# Patient Record
Sex: FEMALE | Race: BLACK OR AFRICAN AMERICAN | NOT HISPANIC OR LATINO | Employment: UNEMPLOYED | URBAN - METROPOLITAN AREA
[De-identification: names, ages, dates, MRNs, and addresses within clinical notes are randomized per-mention and may not be internally consistent; named-entity substitution may affect disease eponyms.]

---

## 2017-05-06 ENCOUNTER — HOSPITAL ENCOUNTER (EMERGENCY)
Facility: HOSPITAL | Age: 32
Discharge: HOME | End: 2017-05-06
Attending: EMERGENCY MEDICINE
Payer: MEDICAID

## 2017-05-06 VITALS
BODY MASS INDEX: 26.68 KG/M2 | SYSTOLIC BLOOD PRESSURE: 130 MMHG | DIASTOLIC BLOOD PRESSURE: 73 MMHG | OXYGEN SATURATION: 100 % | WEIGHT: 170 LBS | RESPIRATION RATE: 20 BRPM | HEIGHT: 67 IN | TEMPERATURE: 98 F | HEART RATE: 98 BPM

## 2017-05-06 DIAGNOSIS — K12.1 MOUTH ULCERS: Primary | ICD-10-CM

## 2017-05-06 PROCEDURE — 99283 EMERGENCY DEPT VISIT LOW MDM: CPT

## 2017-05-06 NOTE — ED AVS SNAPSHOT
[facility] -   [address] [facility] [address]               Benedicto Streeter   2017  9:19 PM   ED    Description:  Female : 1985   Department:  [facility] -            Your Care was Coordinated By:     Provider Role From To    Tano Smith NP Nurse Practitioner [id number] --      Reason for Visit     Mouth Lesions           Diagnoses this Visit        Comments    Mouth ulcers    -  Primary       ED Disposition     None           To Do List           Follow-up Information     Schedule an appointment as soon as possible for a visit with pcp.       These Medications        Disp Refills Start End    (Magic mouthwash) 1:1:1 Benadryl 12.5mg/5ml liq, aluminum & magnesium hydroxide-simehticone (Maalox), lidocaine viscous 2% 100 mL 0 2017     Swish and spit 5 mLs every 4 (four) hours as needed. for mouth sores - Swish & Spit      [facility]     [facility] -  Assistance  Unless otherwise directed by your provider, please contact [facility], our nurse care line that is available for  assistance.     Registered nurses in the [facility] provide: appointment scheduling, clinical advisement, health education, and other advisory services.  Call: [phone] (toll free)               Medications           Message regarding Medications     Verify the changes and/or additions to your medication regime listed below are the same as discussed with your clinician today.  If any of these changes or additions are incorrect, please notify your healthcare provider.        START taking these NEW medications        Refills    (Magic mouthwash) 1:1:1 Benadryl 12.5mg/5ml liq, aluminum & magnesium hydroxide-simehticone (Maalox), lidocaine viscous 2% 0    Sig: Swish and spit 5 mLs every 4 (four) hours as needed. for mouth sores    Class: Print    Route: Swish & Spit           Verify that the below list of  "medications is an accurate representation of the medications you are currently taking.  If none reported, the list may be blank. If incorrect, please contact your healthcare provider. Carry this list with you in case of emergency.           Current Medications     (Magic mouthwash) 1:1:1 Benadryl 12.5mg/5ml liq, aluminum & magnesium hydroxide-simehticone (Maalox), lidocaine viscous 2% Swish and spit 5 mLs every 4 (four) hours as needed. for mouth sores    PRENATAL VIT W-CA,FE,FA,<1 MG, (PRENATAL VITAMIN ORAL) Take 1 tablet by mouth once daily.            Clinical Reference Information           Your Vitals Were     BP Pulse Temp Resp Height Weight    130/73 (BP Location: Right arm, Patient Position: Sitting) 98 98.2 °F (36.8 °C) (Oral) 20 5' 7" (1.702 m) 77.1 kg (170 lb)    SpO2 BMI             100% 26.63 kg/m2         Allergies as of 5/6/2017     No Known Allergies      Immunizations Administered on Date of Encounter - 5/6/2017     None      ED Micro, Lab, POCT     None      ED Imaging Orders     None      Discharge References/Attachments     APHTHOUS ULCER (ENGLISH)    APHTHOUS ULCER, CANKER SORE (CHILD) (ENGLISH)      [facility] Sign-Up     Activating your [facility] account is as easy as 1-2-3!     1) Visit [email or web address], select Sign Up Now, enter this activation code and your date of birth, then select Next.  [email or web address]  Expires: 6/20/2017  9:44 PM      2) Create a username and password to use when you visit [facility] in the future and select a security question in case you lose your password and select Next.    3) Enter your e-mail address and click Sign Up!    Additional Information  If you have questions, please e-mail [email or web address] or call [phone] to talk to our [facility] staff. Remember, [facility] is NOT to be used for urgent needs. For medical emergencies, dial 911.          [facility] complies with applicable Federal civil rights laws and does not discriminate on the " basis of race, color, national origin, age, disability, or sex.        Language Assistance Services     ATTENTION: Language assistance services are available, free of charge. Please call [phone].      ATENCIÓN: Si habla marci, tiene a wagoner disposición servicios gratuitos de asistencia lingüística. Llame al [phone].     CHÚ Ý: N?u b?n nói Ti?ng Vi?t, có các d?ch v? h? tr? ngôn ng? mi?n phí dành cho b?n. G?i s? [phone].

## 2018-04-13 ENCOUNTER — HOSPITAL ENCOUNTER (EMERGENCY)
Facility: HOSPITAL | Age: 33
Discharge: HOME | End: 2018-04-13
Payer: MEDICAID

## 2018-04-13 VITALS
OXYGEN SATURATION: 96 % | DIASTOLIC BLOOD PRESSURE: 70 MMHG | WEIGHT: 210 LBS | BODY MASS INDEX: 32.96 KG/M2 | HEIGHT: 67 IN | HEART RATE: 80 BPM | RESPIRATION RATE: 16 BRPM | SYSTOLIC BLOOD PRESSURE: 131 MMHG | TEMPERATURE: 99 F

## 2018-04-13 DIAGNOSIS — K02.9 DENTAL CARIES: ICD-10-CM

## 2018-04-13 DIAGNOSIS — H92.02 LEFT EAR PAIN: Primary | ICD-10-CM

## 2018-04-13 PROCEDURE — 99283 EMERGENCY DEPT VISIT LOW MDM: CPT

## 2018-04-13 RX ORDER — NAPROXEN 375 MG/1
375 TABLET ORAL 2 TIMES DAILY WITH MEALS
Qty: 20 TABLET | Refills: 0 | OUTPATIENT
Start: 2018-04-13 | End: 2019-12-28

## 2018-04-13 RX ORDER — TRAMADOL HYDROCHLORIDE 50 MG/1
50 TABLET ORAL EVERY 6 HOURS PRN
Qty: 12 TABLET | Refills: 0 | Status: SHIPPED | OUTPATIENT
Start: 2018-04-13 | End: 2018-04-23

## 2018-04-14 NOTE — ED PROVIDER NOTES
SCRIBE #1 NOTE: I, Radha Mckeon, am scribing for, and in the presence of, Hoang Krueger NP. I have scribed the entire note.      History      Chief Complaint   Patient presents with    Otalgia     left ear ache x 5 days, denies any fever        Review of patient's allergies indicates:  No Known Allergies     HPI   HPI    2018, 8:56 PM   History obtained from the patient      History of Present Illness: Benedicto Streeter is a 32 y.o. female patient who presents to the Emergency Department for a L ear ache which onset gradually over the past x5 days. Symptoms are constant and moderate in severity.  No mitigating or exacerbating factors reported. Associated sxs include intermittent coughing and pink eye. Patient denies any fever, chills, sore throat, rhinorrhea, n/v/d, abd pain, dysuria, hematuria, light-headedness, dizziness, and all other sxs at this time.No further complaints or concerns at this time.         Arrival mode: Personal vehicle    PCP: Primary Doctor No       Past Medical History:  No past medical history on file.    Past Surgical History:  Past Surgical History:   Procedure Laterality Date    BLADDER REPAIR W/  SECTION      LASER ABLATION      placental vessels d/t twin twin transfusion         Family History:  Family History   Problem Relation Age of Onset    No Known Problems Mother     No Known Problems Father     Cancer Neg Hx     Arrhythmia Neg Hx     Cardiomyopathy Neg Hx     Congenital heart disease Neg Hx     Early death Neg Hx     Heart attacks under age 50 Neg Hx     Hypertension Neg Hx     Long QT syndrome Neg Hx     Pacemaker/defibrilator Neg Hx     Premature birth Neg Hx        Social History:  Social History     Social History Main Topics    Smoking status: Never Smoker    Smokeless tobacco: Not on file    Alcohol use No    Drug use: No    Sexual activity: Yes     Partners: Male     ROS   Review of Systems   Constitutional: Negative for chills and  fever.   HENT: Positive for ear pain (L ear). Negative for rhinorrhea and sore throat.    Eyes:        +pink eye   Respiratory: Positive for cough (intermittent). Negative for shortness of breath.    Cardiovascular: Negative for chest pain.   Gastrointestinal: Negative for abdominal pain, diarrhea, nausea and vomiting.   Genitourinary: Negative for dysuria and hematuria.   Musculoskeletal: Negative for back pain.   Skin: Negative for rash.   Neurological: Negative for dizziness, weakness, light-headedness and headaches.   Hematological: Does not bruise/bleed easily.   All other systems reviewed and are negative.    Physical Exam      Initial Vitals [04/13/18 2009]   BP Pulse Resp Temp SpO2   131/70 80 16 99.1 °F (37.3 °C) 96 %      MAP       90.33          Physical Exam  Nursing Notes and Vital Signs Reviewed.  Constitutional: Patient is in no apparent distress. Well-developed and well-nourished.  Head: Atraumatic. Normocephalic.  Eyes: PERRL. EOM intact. Conjunctivae are not pale. No scleral icterus.  Ears: Right TM normal. Left TM normal. No erythema. No bulging. No effusion or air-fluid levels. No perforation.   Nose: Patent nares. Turbinates are normal. No drainage.   Mouth/Throat: No evident facial swelling. Patient handles secretions normally. Positive for dental caries. Negative for gingival edema. No palpable fluctuance. No evidence of periodontal or periapical abscess. No trismus.   Neck: Supple. Full ROM. No lymphadenopathy.  Cardiovascular: Regular rate. Regular rhythm. No murmurs, rubs, or gallops. Distal pulses are 2+ and symmetric.  Pulmonary/Chest: No respiratory distress. Clear to auscultation bilaterally. No wheezing or rales.  Abdominal: Soft and non-distended.  There is no tenderness.  No rebound, guarding, or rigidity. Good bowel sounds.  Genitourinary: No CVA tenderness  Musculoskeletal: Moves all extremities. No obvious deformities. No edema. No calf tenderness.  Skin: Warm and  "dry.  Neurological:  Alert, awake, and appropriate.  Normal speech.  No acute focal neurological deficits are appreciated.  Psychiatric: Normal affect. Good eye contact. Appropriate in content.    ED Course    Procedures  ED Vital Signs:  Vitals:    04/13/18 2009   BP: 131/70   Pulse: 80   Resp: 16   Temp: 99.1 °F (37.3 °C)   TempSrc: Oral   SpO2: 96%   Weight: 95.2 kg (209 lb 15.8 oz)   Height: 5' 7" (1.702 m)            The Emergency Provider reviewed the vital signs and test results, which are outlined above.    ED Discussion   9:05 PM: Discussed with pt all pertinent ED information. Discussed pt dx and plan of tx. Gave pt all f/u and return to the ED instructions. All questions and concerns were addressed at this time. Pt expresses understanding of information and instructions, and is comfortable with plan to discharge. Pt is stable for discharge.    I discussed with patient and/or family/caretaker that evaluation in the ED does not suggest any emergent or life threatening medical conditions requiring immediate intervention beyond what was provided in the ED, and I believe patient is safe for discharge.  Regardless, an unremarkable evaluation in the ED does not preclude the development or presence of a serious of life threatening condition. As such, patient was instructed to return immediately for any worsening or change in current symptoms.    ED Medication(s):  Medications - No data to display    New Prescriptions    NAPROXEN (NAPROSYN) 375 MG TABLET    Take 1 tablet (375 mg total) by mouth 2 (two) times daily with meals.    TRAMADOL (ULTRAM) 50 MG TABLET    Take 1 tablet (50 mg total) by mouth every 6 (six) hours as needed.       Follow-up Information     Primary Doctor No In 3 days.                   Medical Decision Making              Scribe Attestation:   Scribe #1: I performed the above scribed service and the documentation accurately describes the services I performed. I attest to the accuracy of the " note.    Attending:   Physician Attestation Statement for Scribe #1: I, Hoang Krueger NP, personally performed the services described in this documentation, as scribed by Radha Mckeon, in my presence, and it is both accurate and complete.          Clinical Impression       ICD-10-CM ICD-9-CM   1. Left ear pain H92.02 388.70   2. Dental caries K02.9 521.00       Disposition:   Disposition: Discharged  Condition: Stable         Hoang Krueger Jr., [facility]  04/14/18 1115

## 2019-12-28 ENCOUNTER — HOSPITAL ENCOUNTER (EMERGENCY)
Facility: HOSPITAL | Age: 34
Discharge: HOME | End: 2019-12-28
Attending: EMERGENCY MEDICINE
Payer: MEDICAID

## 2019-12-28 VITALS
OXYGEN SATURATION: 98 % | BODY MASS INDEX: 36.29 KG/M2 | RESPIRATION RATE: 18 BRPM | TEMPERATURE: 99 F | WEIGHT: 239.44 LBS | SYSTOLIC BLOOD PRESSURE: 115 MMHG | DIASTOLIC BLOOD PRESSURE: 69 MMHG | HEART RATE: 74 BPM | HEIGHT: 68 IN

## 2019-12-28 DIAGNOSIS — S80.11XA CONTUSION OF RIGHT LOWER LEG, INITIAL ENCOUNTER: ICD-10-CM

## 2019-12-28 DIAGNOSIS — T19.2XXA FOREIGN BODY IN VAGINA, INITIAL ENCOUNTER: Primary | ICD-10-CM

## 2019-12-28 DIAGNOSIS — R03.0 ELEVATED BLOOD PRESSURE READING: ICD-10-CM

## 2019-12-28 DIAGNOSIS — M79.661 PAIN IN RIGHT LOWER LEG: ICD-10-CM

## 2019-12-28 PROCEDURE — 99283 EMERGENCY DEPT VISIT LOW MDM: CPT | Mod: 25

## 2019-12-28 NOTE — ED PROVIDER NOTES
History      Chief Complaint   Patient presents with    Knee Pain     R knee, reports hit knee on corner of bed 3 weeks ago and pain is still there, bruising to area, pt wearing knee brace in triage     Foreign Body in Vagina     reports having intercourse last night and condom fell off inside vagina and hasnt came out       Review of patient's allergies indicates:  No Known Allergies     HPI   HPI    2019, 1:03 PM   History obtained from the patient      History of Present Illness: Benedicto Streeter is a 34 y.o. female patient who presents to the Emergency Department for right lower leg pain x 3 weeks.  Patient states that she hit shin on bed frame three weeks ago and continues to have pain.   Patient also reports having intercourse last night and condom fell off inside of vagina.  Denies head injury, LOC, dysuria, vaginal discharge, fever, vomiting, diarrhea, chest pain, SOB, headache, dizziness.       Arrival mode: Personal vehicle    PCP: Primary Doctor No       Past Medical History:  History reviewed. No pertinent past medical history.    Past Surgical History:  Past Surgical History:   Procedure Laterality Date    BLADDER REPAIR W/  SECTION      LASER ABLATION      placental vessels d/t twin twin transfusion         Family History:  Family History   Problem Relation Age of Onset    No Known Problems Mother     No Known Problems Father     Cancer Neg Hx     Arrhythmia Neg Hx     Cardiomyopathy Neg Hx     Congenital heart disease Neg Hx     Early death Neg Hx     Heart attacks under age 50 Neg Hx     Hypertension Neg Hx     Long QT syndrome Neg Hx     Pacemaker/defibrilator Neg Hx     Premature birth Neg Hx        Social History:  Social History     Tobacco Use    Smoking status: Never Smoker    Smokeless tobacco: Never Used   Substance and Sexual Activity    Alcohol use: No    Drug use: No    Sexual activity: Yes     Partners: Male       ROS   Review of Systems    Constitutional: Negative for chills and fever.   HENT: Negative for congestion and rhinorrhea.    Eyes: Negative for discharge and redness.   Respiratory: Negative for cough and wheezing.    Cardiovascular: Negative for chest pain and palpitations.   Gastrointestinal: Negative for diarrhea, nausea and vomiting.   Genitourinary: Negative for dysuria and frequency.        Foreign body in vagina   Musculoskeletal: Positive for arthralgias and myalgias.   Skin: Negative for rash and wound.   Neurological: Negative for dizziness and headaches.       Physical Exam      Initial Vitals [12/28/19 1135]   BP Pulse Resp Temp SpO2   (!) 141/76 72 18 98.8 °F (37.1 °C) 98 %      MAP       --          Physical Exam  Nursing Notes and Vital Signs Reviewed.  Constitutional: Patient is in no apparent distress. Awake and alert. Well-developed and well-nourished.  Head: Atraumatic. Normocephalic.  Eyes: PERRL. EOM intact. Conjunctivae are not pale. No scleral icterus.  ENT: Mucous membranes are moist. Oropharynx is clear and symmetric.    Neck: Supple. Full ROM. No lymphadenopathy.  Cardiovascular: Regular rate. Regular rhythm. No murmurs, rubs, or gallops. Distal pulses are 2+ and symmetric.  Pulmonary/Chest: No respiratory distress. Clear to auscultation bilaterally. No wheezing, rales, or rhonchi.  Abdominal: Soft and non-distended.  There is no tenderness.  No rebound, guarding, or rigidity.   Genitourinary: No CVA tenderness.  Condom in vagina.  No vaginal discharge.  Cervical os closed.   Musculoskeletal: Moves all extremities. No obvious deformities. No edema. No calf tenderness.  RLE:  TTP over anterior lower leg.  Bruising noted to anterior lower leg.  Full ROM.  Dorsalis pedis pulse 2+.  Brisk capillary refill.   Skin: Warm and dry.  Neurological:  Alert, awake, and appropriate.  Normal speech.  No acute focal neurological deficits are appreciated.  Psychiatric: Normal affect. Good eye contact. Appropriate in content.    ED  "Course    Foreign Body  Date/Time: 2019 1:07 PM  Performed by: Ivette Montoya PA-C  Authorized by: Ivette Montoya PA-C   Consent Done: Yes  Consent: Verbal consent obtained.  Risks and benefits: risks, benefits and alternatives were discussed  Consent given by: patient  Patient identity confirmed: , verbally with patient and name  Body area: vagina  Localization method: visualized and speculum  Removal mechanism: forceps  Complexity: simple  1 objects recovered.  Objects recovered: condom  Post-procedure assessment: foreign body removed  Patient tolerance: Patient tolerated the procedure well with no immediate complications      ED Vital Signs:  Vitals:    19 1135 19 1334   BP: (!) 141/76 115/69   Pulse: 72 74   Resp: 18    Temp: 98.8 °F (37.1 °C)    TempSrc: Oral    SpO2: 98% 98%   Weight: 108.6 kg (239 lb 6.7 oz)    Height: 5' 7.5" (1.715 m)        Abnormal Lab Results:  Labs Reviewed - No data to display     All Lab Results:  In process    Imaging Results:  Imaging Results          X-Ray Tibia Fibula 2 View Right (Final result)  Result time 19 12:50:36    Final result by JAIME Aguilar Sr., MD (19 12:50:36)                 Impression:      Normal study.      Electronically signed by: Lionel Aguilar MD  Date:    2019  Time:    12:50             Narrative:    EXAMINATION:  XR TIBIA FIBULA 2 VIEW RIGHT    CLINICAL HISTORY:  Pain in right lower leg    COMPARISON:  None    FINDINGS:  There is no fracture. There is no dislocation.                                        The Emergency Provider reviewed the vital signs and test results, which are outlined above.    ED Discussion     1:08 PM: Reassessed pt at this time.  Pt states her condition has improved at this time. Discussed with pt all pertinent ED information and results. Discussed pt dx and plan of tx. Gave pt all f/u and return to the ED instructions. All questions and concerns were addressed at this time. Pt " expresses understanding of information and instructions, and is comfortable with plan to discharge. Pt is stable for discharge.    I discussed with patient and/or family/caretaker that evaluation in the ED does not suggest any emergent or life threatening medical conditions requiring immediate intervention beyond what was provided in the ED, and I believe patient is safe for discharge.  Regardless, an unremarkable evaluation in the ED does not preclude the development or presence of a serious of life threatening condition. As such, patient was instructed to return immediately for any worsening or change in current symptoms.    Pre-hypertension/Hypertension: The pt has been informed that they may have pre-hypertension or hypertension based on a blood pressure reading in the ED. I recommend that the pt call the PCP listed on their discharge instructions or a physician of their choice this week to arrange f/u for further evaluation of possible pre-hypertension or hypertension.       ED Medication(s):  Medications - No data to display    There are no discharge medications for this patient.      Follow-up Information     [facility]. Schedule an appointment as soon as possible for a visit in 3 days.    Contact information:  [address] [place] [address]  LUP[place] Rodriguez [address]    Phone:  [phone]                   Medical Decision Making                  Clinical Impression       ICD-10-CM ICD-9-CM   1. Foreign body in vagina, initial encounter T19.2XXA 939.2     E915   2. Pain in right lower leg M79.661 729.5   3. Contusion of right lower leg, initial encounter S80.11XA 924.10   4. Elevated blood pressure reading R03.0 796.2       Disposition:   Disposition: Discharged  Condition: Stable           Ivette Montoya PA-C  12/28/19 4533

## 2019-12-28 NOTE — ED NOTES
Pt c/o right knee pain. Pt rates the pain a 9/10. Pt states that she can't bend it. Pt states that she hit the side of bed x 3 weeks ago. Pt has not been seen for injury. Pt tried and failed on OTC tylenol and knee brace. Pt c/o possible condom still up in vagina. Pt tried to see if she could retrieve it herself but failed in finding it.

## 2023-07-31 ENCOUNTER — PATIENT MESSAGE (OUTPATIENT)
Dept: RESEARCH | Facility: HOSPITAL | Age: 38
End: 2023-07-31